# Patient Record
(demographics unavailable — no encounter records)

---

## 2017-01-01 NOTE — NEWBORN INFANT-DISCHARGE
[place] Infant Discharge


Subjective/Events-Last Exam


Infant remained afebrile and hemodynamically stable on room air.  Breastfeeding 

well with improved weight gain from yesterday, now down around 6% from birth 

weight(8% previously).   has evaluated family and transportation 

resources are in place for adequate follow up.


Date Patient Was Seen:  2017


Time Patient Was Seen:  10:00





Condition/Feeding


 Feeding Method:  Breast Milk-Exclusive





Discharge Examination


Level of Alertness:  Alert


Cry Description:  Lusty


Activity/State:  Crying, Active Alert


Suckling:  Rhythmically,Lips Flanged


Head Circumference:  13.50


Fontanelles:  Soft, Flat


Anterior [place] Descriptio:  WNL


Cephalohematoma:  No


Sclera Description:  Clear


Ears:  Normal


Mouth, Nose, Eyes:  Hard & Soft Palate Intact


Red Reflex of the Eyes:  Present bilaterally


Neck:  Head Mobile, Clavicles Intact


Chest Circumference:  13.50


Cardiovascular:  Regular Rhythm, No Murmur, Brachial Pulses Equal, Femoral 

Pulses Equal


Respiratory:  Regular, Unlabored


Breath Sounds:  Clear, Equal


Caput Succedaneum:  No


Abdomen:  Soft, Bowel Sounds Audible


Abdomen Circumference:  13.50


Genitalia:  Appear Normal


Back:  Spine Closed, Gluteal Folds Equal, Anus Patent


Hips:  WNL


Movement:  Symmetric-Body


Muscle Tone:  Active


Extremities:  5 digits present on each extremity


Reflexes:  Jazmine, Suck, Grasp-Bilateral





Weight/Height


Birth Weight:  3742


Height (Inches):  20.50


Height (Calculated Centimeters:  52.991543


Weight (Pounds):  7


Weight (Ounces):  11.1


Weight (Calculated Kilograms):  3.416190


Weight (Calculated Grams):  3489.826





Vital Signs/Labs/SS


Vital Signs





Vital Signs








  Date Time  Temp Pulse Resp B/P (MAP) Pulse Ox O2 Delivery O2 Flow Rate FiO2


 


17 06:43     98   





     96   


 


17 06:43     96   


 


11/15/17 23:30 98.1 140 68     


 


11/15/17 10:20 98.3 152 50     


 


11/15/17 03:25 98.8 130   98   


 


17 20:55 99.1 164 44     


 


17 08:50 98.5 130 50     


 


17 22:10 98.8       


 


17 22:00 98.4 128 48  98   


 


17 21:45 98.2 130 50  99   


 


17 20:15 98.0 132 46     


 


17 18:00 97.8 140 38     


 


17 16:15 97.8       


 


17 16:00 97.6 130 40     


 


17 15:30 97.6 170 56     








Labs


Laboratory Tests


17 15:45:  Total Bilirubin 2.4L





Hearing Screening


Date of Hearing Screening:  2017


Results of Hearing Screening:  Pass





Discharge Diagnosis/Plan


Hep B Vaccine Given?:  Yes


PKU/Bili Done?:  Yes


Cord Clamp Off?:  Yes


Discharge Diagnosis/Impression:  Birth, Infant, Living, Term


Impression Note:


Term female infant born via  at 39w4d to G3 now P3 with pregnancy 

complicated by anemia and depression with intermittent maternal sertraline use, 

maternal blood type O+, GBS negative, RI.


Diagnosis/Problems:  


(1) Term birth of female 


Assessment & Plan:  Routine nursery care, cord MILLIE negative, 24 hour bilirubin 

low risk.


Weight loss improved to 6% with better breastfeeding currently.


Plan for discharge home today with mother.


Follow up with Dr. Stewart at [facility] in the next 3-5 days.








Copy


Copies To 1:   PEG STEWART MD, LANCE DO 2017 11:26

## 2017-01-01 NOTE — NEWBORN PROGRESS NOTE (SOAP)
NB-Subjective/ROS


Subjective/ROS


Subjective/Events-last exam


Afebrile, no acute events.





NB-Exam


Condition/Feeding


 Feeding Method:  Breast





Examination


Vitals





Vital Signs








  Date Time  Temp Pulse Resp B/P (MAP) Pulse Ox O2 Delivery O2 Flow Rate FiO2


 


17 08:50 98.5 130 50     


 


17 22:10 98.8       


 


17 22:00 98.4 128 48  98   


 


17 21:45 98.2 130 50  99   


 


17 20:15 98.0 132 46     


 


17 18:00 97.8 140 38     


 


17 16:15 97.8       


 


17 16:00 97.6 130 40     


 


17 15:30 97.6 170 56     








Level of Alertness:  Alert


Cry Description:  Lusty


Activity/State:  Crying


Suckling:  Rhythmically,Lips Flanged


Head Circumference:  13.50


Fontanelles:  Soft, Flat


Anterior [place] Descriptio:  WNL


Cephalohematoma:  No


Mouth, Nose, Eyes:  Hard & Soft Palate Intact


Neck:  Head Mobile, Clavicles Intact


Chest Circumference:  13.50


Cardiovascular:  Regular Rhythm


Respiratory:  Regular, Unlabored


Breath Sounds:  Clear, Equal


Caput Succedaneum:  No


Abdomen:  Soft, Bowel Sounds Audible


Abdomen Circumference:  13.50


Genitalia:  Appear Normal


Movement:  Symmetric-Body


Muscle Tone:  Active


Extremities:  5 digits present on each extremity


Reflexes:  Suck





Weight/Height(Last Documented)


Height (Inches):  20.50


Height (Calculated Centimeters:  52.918986


Weight (Pounds):  7


Weight (Ounces):  13.6


Weight (Calculated Kilograms):  3.429831


Weight (Calculated Grams):  3560.700





Labs


Labs


Laboratory Tests


17 15:45: 





NB-Plan/Progress


Plan/Progress


Diagnosis/Problems:  


(1) Term birth of female 


Assessment & Plan:  Routine nursery care, cord MILLIE negative, await 24 hour 

bilirubin














HADLEY ANGLIN MD 2017 4:14 pm

## 2017-01-01 NOTE — NEWBORN PROGRESS NOTE (SOAP)
NB-Subjective/ROS


Subjective/ROS


Subjective/Events-last exam


Afebrile, no acute events. Weight down 8% and mother reports she herself 

frequently falls asleep while breastfeeding.





NB-Exam


Condition/Feeding


 Feeding Method:  Breast





Examination


Vitals





Vital Signs








  Date Time  Temp Pulse Resp B/P (MAP) Pulse Ox O2 Delivery O2 Flow Rate FiO2


 


11/15/17 03:25 98.8 130   98   


 


17 20:55 99.1 164 44     


 


17 08:50 98.5 130 50     


 


17 22:10 98.8       


 


17 22:00 98.4 128 48  98   


 


17 21:45 98.2 130 50  99   


 


17 20:15 98.0 132 46     


 


17 18:00 97.8 140 38     


 


17 16:15 97.8       


 


17 16:00 97.6 130 40     


 


17 15:30 97.6 170 56     








Level of Alertness:  Alert


Cry Description:  Lusty


Activity/State:  Crying


Suckling:  Rhythmically,Lips Flanged


Head Circumference:  13.50


Fontanelles:  Soft, Flat


Anterior [place] Descriptio:  WNL


Cephalohematoma:  No


Mouth, Nose, Eyes:  Hard & Soft Palate Intact


Neck:  Head Mobile, Clavicles Intact


Chest Circumference:  13.50


Cardiovascular:  Regular Rhythm


Respiratory:  Regular, Unlabored


Breath Sounds:  Clear, Equal


Caput Succedaneum:  No


Abdomen:  Soft, Bowel Sounds Audible


Abdomen Circumference:  13.50


Genitalia:  Appear Normal


Movement:  Symmetric-Body


Muscle Tone:  Active


Extremities:  5 digits present on each extremity


Reflexes:  Suck





Weight/Height(Last Documented)


Height (Inches):  20.50


Height (Calculated Centimeters:  52.835984


Weight (Pounds):  7


Weight (Ounces):  9.3


Weight (Calculated Kilograms):  3.978035


Weight (Calculated Grams):  3438.797





NB-Plan/Progress


Plan/Progress


Diagnosis/Problems:  


(1) Term birth of female 


Assessment & Plan:  Routine nursery care, cord MILLIE negative, await 24 hour 

bilirubin


24 hour bilirubin low risk


Weight loss at 8%, will monitor overnight to ensure adequate breastfeeding 

before d/c














HADLEY ANGLIN MD Nov 15, 2017 4:14 pm

## 2017-01-01 NOTE — DISCHARGE INST-NURSERY
Discharge Inst-Nursery


Depart Medications


New Medications:  


Cholecalciferol (D-VI-Sol) 400 Unit/1 Ml Drops


400 UNIT PO DAILY, #30 ML 0 Refills


Take 1mL by mouth daily.








Instructions/Follow Up


Patient Instructions/Follow Up:  


Your baby should be fed every 2-3 hours and on demand.  She should follow


up with Dr. Stewart in the next 3-5 days.





Activity


Avoid ALL Tobacco Products:  Smoking of Any Kind





Diet


Pediatric Feeding Method:  Breast





Symptoms Report to Physician


[facility]:  


Temperature to 100.4F or higher, inability to keep any fluids down by


mouth or respiratory distress.


Parent Questions Call:  Nurse @ [phone]


For Problems/Questions:  Contact Your Physician





Skin/Wound Care


Circumcision:  No


Baby Discharge Weight:  O+/3490g


Copies To 1:   PEG STEWART MD





Copy


Copies To 1:   PEG STEWART MD, LANCE DO Nov 16, 2017 11:22

## 2017-01-01 NOTE — NEWBORN INFANT H&P-ADMISSION
[place] [facility]ant Record


Exam Date & Time


Date seen by provider:  2017


Time seen by provider:  15:15





Provider


PCP


[facility]





Delivery Assessment


Expected Date of Delivery:  2017


Hx :  3


Hx Para:  3


Gestational Age in Weeks:  39


Gestational Age in Days:  4


Amniotic Membrane Rupture Time:  15:00


Delivery Date:  2017


Delivery Time:  15:15


Condition of Infant:  Living


Infant Delivery Method:  Spontaneous Vaginal


Operative Indications (Cesarea:  N/A-Vaginal Delivery


Anesthesia Type:  None


Prenatal Events:  Routine Prenatal care


Intrapartal Events:  None


Gender:  Female


Viability:  Living





Mother's Group Strep


Mother's Group B Strep:  Negative





Maternal Labs


Blood Type:  O+


HIV:  Neg


Hep B:  Negative


Rubella:  Immune





Apgar Score


Apgar Score at 1 Minute:  9





Condition/Feeding


Benefits of breastfeeding discussed with mother.


 Feeding Method:  Breast Milk-Exclusive


Gestation:  Single





Admission Examination


Level of Alertness:  Alert


Cry Description:  Lusty


Activity/State:  Crying


Suckling:  Suckled w Encouragement


Skin:  Vernix


Fontanelles:  Soft, Flat


Anterior [place] Descriptio:  WNL


Cephalohematoma:  No


Ears:  Normal


Mouth, Nose, Eyes:  Hard & Soft Palate Intact


Neck:  Head Mobile, Clavicles Intact


Cardiovascular:  Regular Rhythm, No Murmur


Respiratory:  Regular, Unlabored


Breath Sounds:  Crackles, Equal


Caput Succedaneum:  No


Abdomen:  Soft, Bowel Sounds Audible


Genitalia:  Appear Normal


Movement:  Symmetric-Body


Muscle Tone:  Active


Extremities:  5 digits present on each extremity


Reflexes:  Suck





Weight/Height


Birth Weight:  3742





Impression on Admission


Term female infant born via  at 39w4d to G3 now P3 with pregnancy 

complicated by anemia and depression with intermittent maternal sertraline use, 

maternal blood type ***, GBS negative, RI.





Progress/Plan/Problem List


Progress/Plan


Anticipate routine nursery care














HADLEY ANGLIN MD 2017 4:06 pm